# Patient Record
(demographics unavailable — no encounter records)

---

## 2024-12-09 NOTE — HISTORY OF PRESENT ILLNESS
[de-identified] : 86-year-old female presents for evaluation of cervical fracture.  She was seen at [facility] about 3 weeks ago now.  She was diagnosed with a C1 ring fracture.  Her first fall happened [place] weekend in October.  She tripped over the bedpost and sustained a pelvic fracture.  She was being treated nonsurgically for that.  On the Monday before Thanksgiving, she was at home, she basically fell forward onto the carpet.  She was evaluated at [facility] emergency room where a CT scan showed a ring fracture of C1.  She was put into a collar and recommended to have follow-up.  Really she has no significant complaints of pain that are new.  She was having neck pain prior to the fall.  It was mostly on the right side rating towards the right shoulder.  She has a lot of stiffness.  She has been diagnosed with cervical spondylosis.  Since she sustained the injury she has been utilizing initially a hard collar and more recently soft collar.  Again still has some neck pain but nothing significantly changed relative to prior to the fracture.

## 2024-12-09 NOTE — PHYSICAL EXAM
[de-identified] : On exam the cervical collar was removed.  She is got some mild tenderness to palpation in the right paraspinal musculature.  No significant midline tenderness.  Her range of motion is reduced in all planes but significantly in rotation to the right.  Strength in her upper extremities is grossly preserved.  Light touch sensations intact. [de-identified] : 4 views of the cervical spine taken today demonstrates multilevel degenerative changes.  There is an increase in the ANDREA which I measure at about 5 mm which stays consistent through flexion extension without increase.  There is no obvious displacement of fracture fragments of the ring of C1.  CT scan from [facility] reportedly demonstrated a fracture of the C1 posterior arch.

## 2024-12-09 NOTE — ASSESSMENT
[FreeTextEntry1] : Mrs. Robertson has a nondisplaced C1 ring fracture.  I think this will go on to heal.  I do not see any role for surgical intervention.  I did recommend she continue with the cervical collar for the time being.  About 3 weeks at this point and I would anticipate being able to get her out of the collar at approximately the 2-month liudmila.  I will see her back in 5 weeks at which point we will examine her and get some new x-rays and determine her status at that point.  I think the increased ANDREA is likely degenerative in nature also.  I do not see any increase on flexion and extension.  I do not think this is truly a ligamentous injury.  Comfortable just observing this for the time being.  I will see her back in 5 weeks time all of her questions were addressed.

## 2025-01-15 NOTE — PHYSICAL EXAM
[de-identified] : On exam today there is no significant tenderness to palpation.  Her range of motion is reduced but within normal limits.  Neurologically she is intact. [de-identified] : 4 views of the cervical spine taken today demonstrates multilevel degenerative changes from C1 to down through C7-T1.  There is no instability noted on flexion-extension.

## 2025-01-15 NOTE — HISTORY OF PRESENT ILLNESS
[de-identified] : Eli returns today for follow-up.  She is about 6 or 7 weeks status post a fall which resulted in a C1 ring fracture.  She has been doing well from that perspective.  No real neck pain.  She does have some arthritic discomfort on the right side of her neck.

## 2025-03-19 NOTE — ASSESSMENT
[FreeTextEntry1] : Mrs. Robertson is now about 5 months status post a fall sustaining pelvic fracture as well as a C1 ring fracture.  I think she is improving in terms of that.  I think her aching discomfort is related more to her cervical spondylosis at this point.  Suggesting we continue with the PT and reassess her now in 2 to 3 months.  If she still has issues with discomfort at that point we could potentially consider injection therapy if needed.  She is comfortable this plan.  All of her questions were addressed.

## 2025-03-19 NOTE — PHYSICAL EXAM
[de-identified] : On exam her motion today is about 50% of normal.  There is no tenderness to palpation.  Neurologically she is stable.

## 2025-03-19 NOTE — HISTORY OF PRESENT ILLNESS
[de-identified] : Mrs. Robertson returns today for follow-up.  She is here with her son.  I saw her about 2 months ago.  She has been followed for a C1 ring fracture.  She started physical therapy for her neck couple of weeks ago she had 4 or 5 sessions.  She feels as though her motion is improved as a result of it.  Still has some aching discomfort in the neck globally not specific to the upper region.